# Patient Record
Sex: FEMALE | Race: BLACK OR AFRICAN AMERICAN | NOT HISPANIC OR LATINO | URBAN - METROPOLITAN AREA
[De-identification: names, ages, dates, MRNs, and addresses within clinical notes are randomized per-mention and may not be internally consistent; named-entity substitution may affect disease eponyms.]

---

## 2018-07-16 PROBLEM — R01.1 HEART MURMUR: Status: ACTIVE | Noted: 2018-07-16

## 2020-11-23 PROBLEM — R91.1 PULMONARY NODULE: Status: ACTIVE | Noted: 2020-11-23

## 2022-05-31 PROBLEM — N95.9 MENOPAUSAL PROBLEM: Status: ACTIVE | Noted: 2022-05-31

## 2022-06-12 PROBLEM — R80.8 OTHER PROTEINURIA: Status: ACTIVE | Noted: 2022-06-12

## 2023-07-07 PROBLEM — E66.01 SEVERE OBESITY (BMI 35.0-39.9) WITH COMORBIDITY: Status: ACTIVE | Noted: 2023-07-07

## 2024-11-14 ENCOUNTER — TELEPHONE (OUTPATIENT)
Dept: OTHER | Facility: CLINIC | Age: 54
End: 2024-11-14

## 2024-11-14 NOTE — TELEPHONE ENCOUNTER
I spoke to the patient via the telephone. She is back on her BP meds and is checking her BP daily at home. Her latest BP reading was 129/74.I reminded her of a proper diet and exercise to keep and improve her BP readings.